# Patient Record
Sex: FEMALE | Race: BLACK OR AFRICAN AMERICAN | NOT HISPANIC OR LATINO | Employment: OTHER | URBAN - METROPOLITAN AREA
[De-identification: names, ages, dates, MRNs, and addresses within clinical notes are randomized per-mention and may not be internally consistent; named-entity substitution may affect disease eponyms.]

---

## 2019-01-01 ENCOUNTER — HOSPITAL ENCOUNTER (EMERGENCY)
Facility: HOSPITAL | Age: 84
Discharge: HOME | End: 2019-03-29
Attending: FAMILY MEDICINE
Payer: COMMERCIAL

## 2019-01-01 ENCOUNTER — HOSPITAL ENCOUNTER (INPATIENT)
Facility: HOSPITAL | Age: 84
LOS: 2 days | DRG: 951 | End: 2019-06-07
Attending: FAMILY MEDICINE | Admitting: FAMILY MEDICINE
Payer: COMMERCIAL

## 2019-01-01 VITALS
HEART RATE: 74 BPM | OXYGEN SATURATION: 98 % | TEMPERATURE: 98 F | DIASTOLIC BLOOD PRESSURE: 57 MMHG | RESPIRATION RATE: 19 BRPM | SYSTOLIC BLOOD PRESSURE: 115 MMHG | WEIGHT: 180 LBS

## 2019-01-01 VITALS
BODY MASS INDEX: 24.45 KG/M2 | SYSTOLIC BLOOD PRESSURE: 51 MMHG | HEIGHT: 63 IN | TEMPERATURE: 94 F | DIASTOLIC BLOOD PRESSURE: 33 MMHG | WEIGHT: 138 LBS

## 2019-01-01 DIAGNOSIS — R11.0 NAUSEA: ICD-10-CM

## 2019-01-01 DIAGNOSIS — A41.9 SEVERE SEPSIS: ICD-10-CM

## 2019-01-01 DIAGNOSIS — N39.0 URINARY TRACT INFECTION WITHOUT HEMATURIA, SITE UNSPECIFIED: Primary | ICD-10-CM

## 2019-01-01 DIAGNOSIS — R65.20 SEVERE SEPSIS: ICD-10-CM

## 2019-01-01 LAB
ALBUMIN SERPL BCP-MCNC: 4 G/DL (ref 3.5–5.2)
ALP SERPL-CCNC: 72 U/L (ref 38–126)
ALT SERPL W/O P-5'-P-CCNC: 10 U/L (ref 10–44)
ANION GAP SERPL CALC-SCNC: 17 MMOL/L (ref 8–16)
AST SERPL-CCNC: 12 U/L (ref 15–46)
BACTERIA #/AREA URNS AUTO: ABNORMAL /HPF
BASOPHILS # BLD AUTO: 0.03 K/UL (ref 0–0.2)
BASOPHILS NFR BLD: 0.5 % (ref 0–1.9)
BILIRUB SERPL-MCNC: 1.2 MG/DL (ref 0.1–1)
BILIRUB UR QL STRIP: ABNORMAL
BUN SERPL-MCNC: 12 MG/DL (ref 7–17)
CALCIUM SERPL-MCNC: 8.7 MG/DL (ref 8.7–10.5)
CHLORIDE SERPL-SCNC: 96 MMOL/L (ref 95–110)
CLARITY UR REFRACT.AUTO: CLEAR
CO2 SERPL-SCNC: 27 MMOL/L (ref 23–29)
COLOR UR AUTO: ABNORMAL
CREAT SERPL-MCNC: 0.87 MG/DL (ref 0.5–1.4)
DIFFERENTIAL METHOD: ABNORMAL
EOSINOPHIL # BLD AUTO: 0 K/UL (ref 0–0.5)
EOSINOPHIL NFR BLD: 0.3 % (ref 0–8)
ERYTHROCYTE [DISTWIDTH] IN BLOOD BY AUTOMATED COUNT: 17.4 % (ref 11.5–14.5)
EST. GFR  (AFRICAN AMERICAN): >60 ML/MIN/1.73 M^2
EST. GFR  (NON AFRICAN AMERICAN): 58 ML/MIN/1.73 M^2
GLUCOSE SERPL-MCNC: 117 MG/DL (ref 70–110)
GLUCOSE UR QL STRIP: NEGATIVE
HCT VFR BLD AUTO: 41.4 % (ref 37–48.5)
HGB BLD-MCNC: 14 G/DL (ref 12–16)
HGB UR QL STRIP: ABNORMAL
HYALINE CASTS UR QL AUTO: 2 /LPF
KETONES UR QL STRIP: ABNORMAL
LEUKOCYTE ESTERASE UR QL STRIP: ABNORMAL
LIPASE SERPL-CCNC: 33 U/L (ref 23–300)
LYMPHOCYTES # BLD AUTO: 1.4 K/UL (ref 1–4.8)
LYMPHOCYTES NFR BLD: 21.1 % (ref 18–48)
MCH RBC QN AUTO: 28.1 PG (ref 27–31)
MCHC RBC AUTO-ENTMCNC: 33.8 G/DL (ref 32–36)
MCV RBC AUTO: 83 FL (ref 82–98)
MICROSCOPIC COMMENT: ABNORMAL
MONOCYTES # BLD AUTO: 0.6 K/UL (ref 0.3–1)
MONOCYTES NFR BLD: 9.2 % (ref 4–15)
NEUTROPHILS # BLD AUTO: 4.4 K/UL (ref 1.8–7.7)
NEUTROPHILS NFR BLD: 68.6 % (ref 38–73)
NITRITE UR QL STRIP: NEGATIVE
PH UR STRIP: 6 [PH] (ref 5–8)
PLATELET # BLD AUTO: 187 K/UL (ref 150–350)
PMV BLD AUTO: 10.4 FL (ref 9.2–12.9)
POTASSIUM SERPL-SCNC: 2.6 MMOL/L (ref 3.5–5.1)
PROT SERPL-MCNC: 7.5 G/DL (ref 6–8.4)
PROT UR QL STRIP: ABNORMAL
RBC # BLD AUTO: 4.99 M/UL (ref 4–5.4)
RBC #/AREA URNS AUTO: 12 /HPF (ref 0–4)
SODIUM SERPL-SCNC: 140 MMOL/L (ref 136–145)
SP GR UR STRIP: 1.01 (ref 1–1.03)
SQUAMOUS #/AREA URNS AUTO: ABNORMAL /HPF
URN SPEC COLLECT METH UR: ABNORMAL
UROBILINOGEN UR STRIP-ACNC: ABNORMAL EU/DL
WBC # BLD AUTO: 6.4 K/UL (ref 3.9–12.7)
WBC #/AREA URNS AUTO: 3 /HPF (ref 0–5)

## 2019-01-01 PROCEDURE — 63600175 PHARM REV CODE 636 W HCPCS: Mod: ER | Performed by: FAMILY MEDICINE

## 2019-01-01 PROCEDURE — 81000 URINALYSIS NONAUTO W/SCOPE: CPT | Mod: ER

## 2019-01-01 PROCEDURE — 85025 COMPLETE CBC W/AUTO DIFF WBC: CPT | Mod: ER

## 2019-01-01 PROCEDURE — 11000001 HC ACUTE MED/SURG PRIVATE ROOM

## 2019-01-01 PROCEDURE — 63600175 PHARM REV CODE 636 W HCPCS: Performed by: FAMILY MEDICINE

## 2019-01-01 PROCEDURE — 25000003 PHARM REV CODE 250: Performed by: FAMILY MEDICINE

## 2019-01-01 PROCEDURE — 25000003 PHARM REV CODE 250: Mod: ER | Performed by: FAMILY MEDICINE

## 2019-01-01 PROCEDURE — 96365 THER/PROPH/DIAG IV INF INIT: CPT | Mod: ER

## 2019-01-01 PROCEDURE — 96361 HYDRATE IV INFUSION ADD-ON: CPT | Mod: ER

## 2019-01-01 PROCEDURE — 83690 ASSAY OF LIPASE: CPT | Mod: ER

## 2019-01-01 PROCEDURE — 99284 EMERGENCY DEPT VISIT MOD MDM: CPT | Mod: 25,ER

## 2019-01-01 PROCEDURE — 80053 COMPREHEN METABOLIC PANEL: CPT | Mod: ER

## 2019-01-01 PROCEDURE — 96375 TX/PRO/DX INJ NEW DRUG ADDON: CPT | Mod: ER

## 2019-01-01 RX ORDER — SCOLOPAMINE TRANSDERMAL SYSTEM 1 MG/1
1 PATCH, EXTENDED RELEASE TRANSDERMAL
Status: DISCONTINUED | OUTPATIENT
Start: 2019-01-01 | End: 2019-01-01 | Stop reason: HOSPADM

## 2019-01-01 RX ORDER — POTASSIUM CHLORIDE 20 MEQ/1
40 TABLET, EXTENDED RELEASE ORAL
Status: COMPLETED | OUTPATIENT
Start: 2019-01-01 | End: 2019-01-01

## 2019-01-01 RX ORDER — ONDANSETRON 2 MG/ML
4 INJECTION INTRAMUSCULAR; INTRAVENOUS
Status: COMPLETED | OUTPATIENT
Start: 2019-01-01 | End: 2019-01-01

## 2019-01-01 RX ORDER — CIPROFLOXACIN 500 MG/1
500 TABLET ORAL 2 TIMES DAILY
Qty: 20 TABLET | Refills: 0 | Status: SHIPPED | OUTPATIENT
Start: 2019-01-01 | End: 2019-01-01

## 2019-01-01 RX ORDER — CIPROFLOXACIN 2 MG/ML
400 INJECTION, SOLUTION INTRAVENOUS
Status: COMPLETED | OUTPATIENT
Start: 2019-01-01 | End: 2019-01-01

## 2019-01-01 RX ORDER — MORPHINE SULFATE 10 MG/ML
5 INJECTION INTRAMUSCULAR; INTRAVENOUS; SUBCUTANEOUS ONCE
Status: DISCONTINUED | OUTPATIENT
Start: 2019-01-01 | End: 2019-01-01 | Stop reason: HOSPADM

## 2019-01-01 RX ORDER — ONDANSETRON 4 MG/1
4 TABLET, FILM COATED ORAL EVERY 6 HOURS
Qty: 12 TABLET | Refills: 0 | Status: ON HOLD | OUTPATIENT
Start: 2019-01-01 | End: 2019-01-01 | Stop reason: HOSPADM

## 2019-01-01 RX ADMIN — SCOPALAMINE 1 PATCH: 1 PATCH, EXTENDED RELEASE TRANSDERMAL at 09:06

## 2019-01-01 RX ADMIN — ONDANSETRON 4 MG: 2 INJECTION INTRAMUSCULAR; INTRAVENOUS at 08:03

## 2019-01-01 RX ADMIN — POTASSIUM CHLORIDE 40 MEQ: 20 TABLET, EXTENDED RELEASE ORAL at 10:03

## 2019-01-01 RX ADMIN — CIPROFLOXACIN 400 MG: 2 INJECTION, SOLUTION INTRAVENOUS at 09:03

## 2019-01-01 RX ADMIN — MORPHINE SULFATE 8 MG/HR: 10 INJECTION INTRAVENOUS at 11:06

## 2019-01-01 RX ADMIN — MORPHINE SULFATE 8 MG/HR: 10 INJECTION INTRAVENOUS at 02:06

## 2019-01-01 RX ADMIN — LORAZEPAM 2 MG: 2 INJECTION INTRAMUSCULAR; INTRAVENOUS at 08:06

## 2019-01-01 RX ADMIN — MORPHINE SULFATE 4 MG/HR: 10 INJECTION INTRAVENOUS at 09:06

## 2019-01-01 RX ADMIN — SODIUM CHLORIDE 500 ML: 0.9 INJECTION, SOLUTION INTRAVENOUS at 08:03

## 2019-03-29 NOTE — ED TRIAGE NOTES
"per pts granddaughter "when she eats/drinks, it comes up." Pts PCP contacted and instructed to bring pt to ED to check for possible UTI. +Weakness. 2 episodes of diarrhea yesterday. No fever.  "

## 2019-03-30 NOTE — ED PROVIDER NOTES
"Encounter Date: 3/29/2019       History     Chief Complaint   Patient presents with    Emesis     per pts granddaughter "when she eats/drinks, it comes up." Pts PCP contacted and instructed to bring pt to ED to check for possible UTI. +Weakness. 2 episodes of diarrhea yesterday. No fever.     89-year-old female patient comes in with complaint vomiting each time she tries to the otherwise patient states that she just does not have an appetite patient states she does not have any abdominal pain with it no other complaints regarding that patient.  Patient otherwise has been getting around her norm and is able to think her normal process.  No fever chills or night sweats at        Review of patient's allergies indicates:   Allergen Reactions    Penicillins      Past Medical History:   Diagnosis Date    Arthritis     Cancer     Colon cancer 1999    Diabetes mellitus     Hypertension      Past Surgical History:   Procedure Laterality Date    KNEE SURGERY Right      History reviewed. No pertinent family history.  Social History     Tobacco Use    Smoking status: Never Smoker   Substance Use Topics    Alcohol use: Not Currently     Frequency: Never    Drug use: Not on file     Review of Systems   Constitutional: Negative for fever.   HENT: Negative for sore throat.    Respiratory: Negative for shortness of breath.    Cardiovascular: Negative for chest pain.   Gastrointestinal: Positive for nausea.   Genitourinary: Negative for dysuria.   Musculoskeletal: Negative for back pain.   Skin: Negative for rash.   Neurological: Negative for weakness.   Hematological: Does not bruise/bleed easily.   All other systems reviewed and are negative.      Physical Exam     Initial Vitals [03/29/19 1859]   BP Pulse Resp Temp SpO2   (!) 120/58 94 18 98.1 °F (36.7 °C) 96 %      MAP       --         Physical Exam    Nursing note and vitals reviewed.  Constitutional: She appears well-developed.   HENT:   Head: Normocephalic and " atraumatic.   Right Ear: External ear normal.   Left Ear: External ear normal.   Nose: Nose normal.   Mouth/Throat: Oropharynx is clear and moist.   Eyes: Conjunctivae and EOM are normal. Pupils are equal, round, and reactive to light. Right eye exhibits no discharge. Left eye exhibits no discharge.   Neck: Normal range of motion. Neck supple. No tracheal deviation present.   Cardiovascular: Normal rate, regular rhythm and normal heart sounds.   No murmur heard.  Pulmonary/Chest: Breath sounds normal. No respiratory distress. She has no wheezes.   Abdominal: Soft. Bowel sounds are normal.   Neurological: She is alert and oriented to person, place, and time.   Skin: Skin is warm and dry.         ED Course   Procedures  Labs Reviewed   CBC W/ AUTO DIFFERENTIAL - Abnormal; Notable for the following components:       Result Value    RDW 17.4 (*)     All other components within normal limits   COMPREHENSIVE METABOLIC PANEL - Abnormal; Notable for the following components:    Potassium 2.6 (*)     Glucose 117 (*)     Total Bilirubin 1.2 (*)     AST 12 (*)     Anion Gap 17 (*)     eGFR if non  58.0 (*)     All other components within normal limits    Narrative:        K+ critical result(s) called and verbal readback obtained from    Brianna White RN, 03/29/2019 21:59   URINALYSIS, REFLEX TO URINE CULTURE - Abnormal; Notable for the following components:    Color, UA Brown (*)     Protein, UA 1+ (*)     Ketones, UA 3+ (*)     Bilirubin (UA) 1+ (*)     Occult Blood UA 3+ (*)     Urobilinogen, UA 4.0-6.0 (*)     Leukocytes, UA 1+ (*)     All other components within normal limits    Narrative:     Preferred Collection Type->Urine, Clean Catch   URINALYSIS MICROSCOPIC - Abnormal; Notable for the following components:    RBC, UA 12 (*)     Hyaline Casts, UA 2 (*)     All other components within normal limits    Narrative:     Preferred Collection Type->Urine, Clean Catch   LIPASE          Imaging Results     None          Medical Decision Making:   Initial Assessment:   Patient in moderate distress and no other complains    Differential Diagnosis:   Appendicitis , constipation, diverticulitis, colitis, gastroenteritis                        Clinical Impression:       ICD-10-CM ICD-9-CM   1. Urinary tract infection without hematuria, site unspecified N39.0 599.0   2. Nausea R11.0 787.02                                Woodrow Barger MD  03/29/19 2242

## 2019-06-05 PROBLEM — Z51.5 COMFORT MEASURES ONLY STATUS: Status: ACTIVE | Noted: 2019-01-01

## 2019-06-05 PROBLEM — R65.20 SEVERE SEPSIS: Status: ACTIVE | Noted: 2019-01-01

## 2019-06-05 PROBLEM — Z51.5 PALLIATIVE CARE ENCOUNTER: Status: ACTIVE | Noted: 2019-01-01

## 2019-06-05 PROBLEM — A41.9 SEVERE SEPSIS: Status: ACTIVE | Noted: 2019-01-01

## 2019-06-05 PROBLEM — F03.90 DEMENTIA WITHOUT BEHAVIORAL DISTURBANCE: Status: ACTIVE | Noted: 2019-01-01

## 2019-06-05 PROBLEM — Z71.89 GOALS OF CARE, COUNSELING/DISCUSSION: Status: ACTIVE | Noted: 2019-01-01

## 2019-06-05 NOTE — SUBJECTIVE & OBJECTIVE
Interval History: unresponsive. Discussed comfort care with family    Medications:  Continuous Infusions:  Scheduled Meds:  PRN Meds:    Objective:     Vital Signs (Most Recent):    Vital Signs (24h Range):  Temp:  [97.4 °F (36.3 °C)-97.8 °F (36.6 °C)] 97.5 °F (36.4 °C)  Pulse:  [123-139] 129  Resp:  [14-31] 17  SpO2:  [74 %-100 %] 98 %  BP: ()/(48-61) 86/48        There is no height or weight on file to calculate BMI.    Review of Symptoms  Symptom Assessment (ESAS 0-10 scale)  ESAS 0 1 2 3 4 5 6 7 8 9 10   Pain              Dyspnea              Anxiety              Nausea              Depression               Anorexia              Fatigue              Insomnia              Restlessness               Agitation              CAM / Delirium x__ --  ___+   Constipation     __ --  ___+   Diarrhea           x__ --  ___+  Bowel Management Plan (BMP): Yes  x  Performance Status: 10    ECOG Performance Status Grade: 4 - Completely disabled    Physical Exam    Significant Labs: None  CBC:   Recent Labs   Lab 06/05/19  1500   WBC 15.51*   HGB 5.6*   HCT 18.0*   MCV 95        BMP:  Recent Labs   Lab 06/05/19  1500   *   *   K 3.0*   *   CO2 5*   BUN 22   CREATININE 1.3   CALCIUM 7.3*   MG 1.1*     LFT:  Lab Results   Component Value Date    AST 49 (H) 06/05/2019    ALKPHOS 75 06/05/2019    BILITOT 3.9 (H) 06/05/2019     Albumin:   Albumin   Date Value Ref Range Status   06/05/2019 1.4 (L) 3.5 - 5.2 g/dL Final     Protein:   Total Protein   Date Value Ref Range Status   06/05/2019 3.6 (L) 6.0 - 8.4 g/dL Final     Lactic acid:   Lab Results   Component Value Date    LACTATE >12.0 (HH) 06/05/2019       Significant Imaging: None    Advanced Directives::  Living Will: Yes. Copy on chart: Yes  LaPOST: Yes  Do Not Resuscitate Status: Yes  Medical Power of : Yes. Agent's Name: Kay Turcios. Agent's Contact Number: [phone]    Decision-Making Capacity: Family answered questions, Patient  unable to communicate due to disease severity/cognitive impairment    Living Arrangements: Lives with family    Psychosocial/Cultural:  Patient's most important priorities:  Unable to asses    Patient's biggest concerns/fears:  Unable to asses    Previous death/end of life care history:  Unable to asses    Patient's goals/hopes:  Unable to asses

## 2019-06-05 NOTE — HPI
Kaylah Turcios is a 89 y.o. F, with PMH of Arthritis, Cancer, Colon cancer (1999), Diabetes mellitus, and Hypertension.  Patient presents to the ED via EMS due to respiratory arrest just prior to arrival. Patient was reported to be lethargic and weak today. EMS reports while en route to the ER patient became unresponsive and stopped breathing. She was intubated via NT tube en route per EMS     Palliative has been consulted for end of life hospice care        Palliative care met with family grand daughter, son, sisters, to establish a rapport and and assess family understanding of current clinical condition and its relationship to goals of care. Family recently notified patient has dementia and during wound care visit were told patient wound is worsening because of malnutrition and end stage dementia. Family educated on clinical status and disease progression. Family made a decision to transition to comfort measures. Emotional comfort provided.      [facility]   Patient on levophed, SBP still in the 80s. BP 86/48. Patient too unstable for transportation at this time. Requiring inpatient hospice services until stable or symptoms controlled.  Plan:   - Patient too unstable for transportation at this time. Requiring inhospital hospice services until stable or symptoms controlled.  - consult [facility] for inpatient service

## 2019-06-05 NOTE — ASSESSMENT & PLAN NOTE
Palliative care encounter  Goals of care discussion  Comfort Measures  Ativan for agitation  Morphine for dyspnea  Bereavement tray

## 2019-06-06 NOTE — SUBJECTIVE & OBJECTIVE
Interval History: unresponsive, no family by bedside.   Tachycardia increase morphine    Medications:  Continuous Infusions:  Scheduled Meds:  PRN Meds:    Objective:     Vital Signs (Most Recent):  Temp: (!) 93.6 °F (34.2 °C) (06/05/19 2156)  Pulse: 95 (06/06/19 0750)  Resp: 16 (06/06/19 0750)  BP: (!) 51/33 (06/06/19 0750) Vital Signs (24h Range):  Temp:  [93.6 °F (34.2 °C)-97.8 °F (36.6 °C)] 93.6 °F (34.2 °C)  Pulse:  [] 95  Resp:  [14-31] 16  SpO2:  [74 %-100 %] 98 %  BP: ()/(33-61) 51/33        There is no height or weight on file to calculate BMI.    Review of Symptoms unable to assess  Symptom Assessment (ESAS 0-10 scale)  ESAS 0 1 2 3 4 5 6 7 8 9 10   Pain              Dyspnea              Anxiety              Nausea              Depression               Anorexia              Fatigue              Insomnia              Restlessness               Agitation              CAM / Delirium x__ --  ___+   Constipation     __ --  ___+   Diarrhea           x__ --  ___+  Bowel Management Plan (BMP): Yes  x  Performance Status: 10    ECOG Performance Status Grade: 4 - Completely disabled    Physical Exam   Constitutional: She appears well-developed.   Neurological: She is unresponsive.       Significant Labs: None  CBC:   Recent Labs   Lab 06/05/19  1500   WBC 15.51*   HGB 5.6*   HCT 18.0*   MCV 95        BMP:  Recent Labs   Lab 06/05/19  1500   *   *   K 3.0*   *   CO2 5*   BUN 22   CREATININE 1.3   CALCIUM 7.3*   MG 1.1*     LFT:  Lab Results   Component Value Date    AST 49 (H) 06/05/2019    ALKPHOS 75 06/05/2019    BILITOT 3.9 (H) 06/05/2019     Albumin:   Albumin   Date Value Ref Range Status   06/05/2019 1.4 (L) 3.5 - 5.2 g/dL Final     Protein:   Total Protein   Date Value Ref Range Status   06/05/2019 3.6 (L) 6.0 - 8.4 g/dL Final     Lactic acid:   Lab Results   Component Value Date    LACTATE >12.0 (HH) 06/05/2019       Significant Imaging: None    Advanced  Directives::  Living Will: Yes. Copy on chart: Yes  LaPOST: Yes  Do Not Resuscitate Status: Yes  Medical Power of : Yes. Agent's Name: Kay Turcios. Agent's Contact Number: [phone]    Decision-Making Capacity: Family answered questions, Patient unable to communicate due to disease severity/cognitive impairment    Living Arrangements: Lives with family    Psychosocial/Cultural:  Patient's most important priorities:  Unable to asses    Patient's biggest concerns/fears:  Unable to asses    Previous death/end of life care history:  Unable to asses    Patient's goals/hopes:  Unable to asses

## 2019-06-06 NOTE — PROGRESS NOTES
[facility]  Palliative Medicine  History and Physical    Patient Name: Kaylah Turcios  MRN: [id number]  Admission Date: (Not on file)  Hospital Length of Stay: 0 days  Code Status: No Order   Attending Provider: Yessi Abarca*  Consulting Provider: Yessi Abarca MD  Primary Care Physician: Primary Doctor No  Principal Problem:Sepsis    Patient information was obtained from relative(s) and ER records.      Assessment/Plan:     * Sepsis  Dementia  Sacral stage IV decubitus Ulcer  Severe malnutrition   Patient decline food  bedbound      Comfort measures only status  Palliative care encounter  Goals of care discussion  Comfort Measures  Ativan for agitation  Morphine for dyspnea  Bereavement tray        none    Subjective:     Chief Complaint: No chief complaint on file.      HPI:   Kaylah Turcios is a 89 y.o. F, with PMH of Arthritis, Cancer, Colon cancer (1999), Diabetes mellitus, and Hypertension.  Patient presents to the ED via EMS due to respiratory arrest just prior to arrival. Patient was reported to be lethargic and weak today. EMS reports while en route to the ER patient became unresponsive and stopped breathing. She was intubated via NT tube en route per EMS     Palliative has been consulted for end of life hospice care        Palliative care met with family grand daughter, son, sisters, to establish a rapport and and assess family understanding of current clinical condition and its relationship to goals of care. Family recently notified patient has dementia and during wound care visit were told patient wound is worsening because of malnutrition and end stage dementia. Family educated on clinical status and disease progression. Family made a decision to transition to comfort measures. Emotional comfort provided.      [facility].   Patient on levophed, SBP still in the 80s. BP 86/48. Patient too unstable for transportation at this time. Requiring inpatient  hospice services until stable or symptoms controlled.  Plan:   - Patient too unstable for transportation at this time. Requiring inhospital hospice services until stable or symptoms controlled.  - consult [facility] for inpatient service    Hospital Course:  No notes on file    Interval History: unresponsive. Discussed comfort care with family    Medications:  Continuous Infusions:  Scheduled Meds:  PRN Meds:    Objective:     Vital Signs (Most Recent):    Vital Signs (24h Range):  Temp:  [97.4 °F (36.3 °C)-97.8 °F (36.6 °C)] 97.5 °F (36.4 °C)  Pulse:  [123-139] 129  Resp:  [14-31] 17  SpO2:  [74 %-100 %] 98 %  BP: ()/(48-61) 86/48        There is no height or weight on file to calculate BMI.    Review of Symptoms  Symptom Assessment (ESAS 0-10 scale)  ESAS 0 1 2 3 4 5 6 7 8 9 10   Pain              Dyspnea              Anxiety              Nausea              Depression               Anorexia              Fatigue              Insomnia              Restlessness               Agitation              CAM / Delirium x__ --  ___+   Constipation     __ --  ___+   Diarrhea           x__ --  ___+  Bowel Management Plan (BMP): Yes  x  Performance Status: 10    ECOG Performance Status Grade: 4 - Completely disabled    Physical Exam    Significant Labs: None  CBC:   Recent Labs   Lab 06/05/19  1500   WBC 15.51*   HGB 5.6*   HCT 18.0*   MCV 95        BMP:  Recent Labs   Lab 06/05/19  1500   *   *   K 3.0*   *   CO2 5*   BUN 22   CREATININE 1.3   CALCIUM 7.3*   MG 1.1*     LFT:  Lab Results   Component Value Date    AST 49 (H) 06/05/2019    ALKPHOS 75 06/05/2019    BILITOT 3.9 (H) 06/05/2019     Albumin:   Albumin   Date Value Ref Range Status   06/05/2019 1.4 (L) 3.5 - 5.2 g/dL Final     Protein:   Total Protein   Date Value Ref Range Status   06/05/2019 3.6 (L) 6.0 - 8.4 g/dL Final     Lactic acid:   Lab Results   Component Value Date    LACTATE >12.0 (HH) 06/05/2019       Significant  Imaging: None    Advanced Directives::  Living Will: Yes. Copy on chart: Yes  LaPOST: Yes  Do Not Resuscitate Status: Yes  Medical Power of : Yes. Agent's Name: Kay Turcios. Agent's Contact Number: [phone]    Decision-Making Capacity: Family answered questions, Patient unable to communicate due to disease severity/cognitive impairment    Living Arrangements: Lives with family    Psychosocial/Cultural:  Patient's most important priorities:  Unable to asses    Patient's biggest concerns/fears:  Unable to asses    Previous death/end of life care history:  Unable to asses    Patient's goals/hopes:  Unable to asses          > 50% of 65 min visit spent in chart review, face to face discussion of goals of care,  symptom assessment, coordination of care and emotional support.    Yessi Abarca MD  Palliative Medicine  [facility]

## 2019-06-06 NOTE — PLAN OF CARE
Problem: Adult Inpatient Plan of Care  Goal: Plan of Care Review  Outcome: Ongoing (interventions implemented as appropriate)  Inpatient hospice comfort measures maintained. Morphine gtt infusing at 4mg/hr. Patients respirations have remained unlabored but shallow ranging [id number]/min.  Patient only responsive to painful stimulation. Sierra catheter in place for comfort measures. Wound to sacrum with dressing in place.  Will continue to monitor

## 2019-06-06 NOTE — PLAN OF CARE
Patient arrived to room 419 via stretcher and placed into bed. Patient with even respirations at 21/minute. Patient minimally responsive to verbal and tactile stimuli. Extremities cool to the touch.  Right chest wall picc dressing present on arrival with dressing c/d/i.  Sierra catheter in place present on arrival to floor for comfort measures. Urine output noted to be dark, cloudy crown.  Safety measures maintained and bed alarm set. Will continue to monitor.

## 2019-06-06 NOTE — H&P
[facility]  Palliative Medicine  History and Physical    Patient Name: Kaylah Turcios  MRN: [id number]  Admission Date: (Not on file)  Hospital Length of Stay: 0 days  Code Status: No Order   Attending Provider: Yessi Abarca*  Consulting Provider: Yessi Abarca MD  Primary Care Physician: Primary Doctor No  Principal Problem:Sepsis    Patient information was obtained from relative(s) and ER records.      Assessment/Plan:     * Sepsis  Dementia  Sacral stage IV decubitus Ulcer  Severe malnutrition   Patient decline food  bedbound      Comfort measures only status  Palliative care encounter  Goals of care discussion  Comfort Measures  Ativan for agitation  Morphine for dyspnea  Bereavement tray        none    Subjective:     Chief Complaint: No chief complaint on file.      HPI:   Kaylah Turcios is a 89 y.o. F, with PMH of Arthritis, Cancer, Colon cancer (1999), Diabetes mellitus, and Hypertension.  Patient presents to the ED via EMS due to respiratory arrest just prior to arrival. Patient was reported to be lethargic and weak today. EMS reports while en route to the ER patient became unresponsive and stopped breathing. She was intubated via NT tube en route per EMS     Palliative has been consulted for end of life hospice care        Palliative care met with family grand daughter, son, sisters, to establish a rapport and and assess family understanding of current clinical condition and its relationship to goals of care. Family recently notified patient has dementia and during wound care visit were told patient wound is worsening because of malnutrition and end stage dementia. Family educated on clinical status and disease progression. Family made a decision to transition to comfort measures. Emotional comfort provided.      [facility].   Patient on levophed, SBP still in the 80s. BP 86/48. Patient too unstable for transportation at this time. Requiring inpatient  hospice services until stable or symptoms controlled.  Plan:   - Patient too unstable for transportation at this time. Requiring inhospital hospice services until stable or symptoms controlled.  - consult [facility] for inpatient service    Hospital Course:  No notes on file    Interval History: unresponsive. Discussed comfort care with family    Medications:  Continuous Infusions:  Scheduled Meds:  PRN Meds:    Objective:     Vital Signs (Most Recent):    Vital Signs (24h Range):  Temp:  [97.4 °F (36.3 °C)-97.8 °F (36.6 °C)] 97.5 °F (36.4 °C)  Pulse:  [123-139] 129  Resp:  [14-31] 17  SpO2:  [74 %-100 %] 98 %  BP: ()/(48-61) 86/48        There is no height or weight on file to calculate BMI.    Review of Symptoms  Symptom Assessment (ESAS 0-10 scale)  ESAS 0 1 2 3 4 5 6 7 8 9 10   Pain              Dyspnea              Anxiety              Nausea              Depression               Anorexia              Fatigue              Insomnia              Restlessness               Agitation              CAM / Delirium x__ --  ___+   Constipation     __ --  ___+   Diarrhea           x__ --  ___+  Bowel Management Plan (BMP): Yes  x  Performance Status: 10    ECOG Performance Status Grade: 4 - Completely disabled    Physical Exam    Significant Labs: None  CBC:   Recent Labs   Lab 06/05/19  1500   WBC 15.51*   HGB 5.6*   HCT 18.0*   MCV 95        BMP:  Recent Labs   Lab 06/05/19  1500   *   *   K 3.0*   *   CO2 5*   BUN 22   CREATININE 1.3   CALCIUM 7.3*   MG 1.1*     LFT:  Lab Results   Component Value Date    AST 49 (H) 06/05/2019    ALKPHOS 75 06/05/2019    BILITOT 3.9 (H) 06/05/2019     Albumin:   Albumin   Date Value Ref Range Status   06/05/2019 1.4 (L) 3.5 - 5.2 g/dL Final     Protein:   Total Protein   Date Value Ref Range Status   06/05/2019 3.6 (L) 6.0 - 8.4 g/dL Final     Lactic acid:   Lab Results   Component Value Date    LACTATE >12.0 (HH) 06/05/2019       Significant  Imaging: None    Advanced Directives::  Living Will: Yes. Copy on chart: Yes  LaPOST: Yes  Do Not Resuscitate Status: Yes  Medical Power of : Yes. Agent's Name: Kay Turcios. Agent's Contact Number: [phone]    Decision-Making Capacity: Family answered questions, Patient unable to communicate due to disease severity/cognitive impairment    Living Arrangements: Lives with family    Psychosocial/Cultural:  Patient's most important priorities:  Unable to asses    Patient's biggest concerns/fears:  Unable to asses    Previous death/end of life care history:  Unable to asses    Patient's goals/hopes:  Unable to asses        > 50% of 65 min visit spent in chart review, face to face discussion of goals of care,  symptom assessment, coordination of care and emotional support.    Yessi Abarca MD  Palliative Medicine  [facility]

## 2019-06-06 NOTE — PROGRESS NOTES
[facility]  Palliative Medicine  Progress Note    Patient Name: Kaylah Turcios  MRN: [id number]  Admission Date: 6/5/2019  Hospital Length of Stay: 1 days  Code Status: DNR   Attending Provider: Yessi Abarca*  Consulting Provider: Yessi Abarca MD  Primary Care Physician: Primary Doctor No  Principal Problem:Sepsis    Patient information was obtained from relative(s) and ER records.      Assessment/Plan:     * Sepsis  Dementia  Sacral stage IV decubitus Ulcer  Severe malnutrition   Patient decline food  bedbound      Comfort measures only status  Palliative care encounter  Goals of care discussion  Comfort Measures  Ativan for agitation  Morphine for dyspnea  Bereavement tray        none     Subjective:     Chief Complaint: No chief complaint on file.      HPI:   Kaylah Turcios is a 89 y.o. F, with PMH of Arthritis, Cancer, Colon cancer (1999), Diabetes mellitus, and Hypertension.  Patient presents to the ED via EMS due to respiratory arrest just prior to arrival. Patient was reported to be lethargic and weak today. EMS reports while en route to the ER patient became unresponsive and stopped breathing. She was intubated via NT tube en route per EMS     Palliative has been consulted for end of life hospice care        Palliative care met with family grand daughter, son, sisters, to establish a rapport and and assess family understanding of current clinical condition and its relationship to goals of care. Family recently notified patient has dementia and during wound care visit were told patient wound is worsening because of malnutrition and end stage dementia. Family educated on clinical status and disease progression. Family made a decision to transition to comfort measures. Emotional comfort provided.      [facility].   Patient on levophed, SBP still in the 80s. BP 86/48. Patient too unstable for transportation at this time. Requiring inpatient hospice services  until stable or symptoms controlled.  Plan:   - Patient too unstable for transportation at this time. Requiring inhospital hospice services until stable or symptoms controlled.  - consult [facility] for inpatient service    Hospital Course:  No notes on file    Interval History: unresponsive, no family by bedside.   Tachycardia increase morphine    Medications:  Continuous Infusions:  Scheduled Meds:  PRN Meds:    Objective:     Vital Signs (Most Recent):  Temp: (!) 93.6 °F (34.2 °C) (06/05/19 2156)  Pulse: 95 (06/06/19 0750)  Resp: 16 (06/06/19 0750)  BP: (!) 51/33 (06/06/19 0750) Vital Signs (24h Range):  Temp:  [93.6 °F (34.2 °C)-97.8 °F (36.6 °C)] 93.6 °F (34.2 °C)  Pulse:  [] 95  Resp:  [14-31] 16  SpO2:  [74 %-100 %] 98 %  BP: ()/(33-61) 51/33        There is no height or weight on file to calculate BMI.    Review of Symptoms unable to assess  Symptom Assessment (ESAS 0-10 scale)  ESAS 0 1 2 3 4 5 6 7 8 9 10   Pain              Dyspnea              Anxiety              Nausea              Depression               Anorexia              Fatigue              Insomnia              Restlessness               Agitation              CAM / Delirium x__ --  ___+   Constipation     __ --  ___+   Diarrhea           x__ --  ___+  Bowel Management Plan (BMP): Yes  x  Performance Status: 10    ECOG Performance Status Grade: 4 - Completely disabled    Physical Exam   Constitutional: She appears well-developed.   Neurological: She is unresponsive.       Significant Labs: None  CBC:   Recent Labs   Lab 06/05/19  1500   WBC 15.51*   HGB 5.6*   HCT 18.0*   MCV 95        BMP:  Recent Labs   Lab 06/05/19  1500   *   *   K 3.0*   *   CO2 5*   BUN 22   CREATININE 1.3   CALCIUM 7.3*   MG 1.1*     LFT:  Lab Results   Component Value Date    AST 49 (H) 06/05/2019    ALKPHOS 75 06/05/2019    BILITOT 3.9 (H) 06/05/2019     Albumin:   Albumin   Date Value Ref Range Status   06/05/2019 1.4 (L)  3.5 - 5.2 g/dL Final     Protein:   Total Protein   Date Value Ref Range Status   06/05/2019 3.6 (L) 6.0 - 8.4 g/dL Final     Lactic acid:   Lab Results   Component Value Date    LACTATE >12.0 (HH) 06/05/2019       Significant Imaging: None    Advanced Directives::  Living Will: Yes. Copy on chart: Yes  [place]POST: Yes  Do Not Resuscitate Status: Yes  Medical Power of : Yes. Agent's Name: Kay Turcios. Agent's Contact Number:     Decision-Making Capacity: Family answered questions, Patient unable to communicate due to disease severity/cognitive impairment    Living Arrangements: Lives with family    Psychosocial/Cultural:  Patient's most important priorities:  Unable to asses    Patient's biggest concerns/fears:  Unable to asses    Previous death/end of life care history:  Unable to asses    Patient's goals/hopes:  Unable to asses          > 50% of 45 min visit spent in chart review, symptom assessment, coordination of care and emotional support.    Yessi Abarca MD  Palliative Medicine  [facility].

## 2019-06-06 NOTE — PLAN OF CARE
Patient lying in bed, resting quietly. Unresponsive for all of shift. No S/S of pain or discomfort noted at this time. Patient's respirations noted to have decreased throughout shift. Morphine gtt infusing @ 8 mg/hour. Hospice nurse at bedside this shift. Patient's vitals unable to obtain with dinamap. Extremities cool to touch. Sierra catheter CDI with dark, yellow urine noted draining to  bag. No odor noted. No tele. Plan of care and medications reviewed with patient's family- all verbalize understanding. Bed in lowest position, side rails up x 2, call light within reach. Will continue to monitor patient.

## 2019-06-06 NOTE — NURSING
Cued into patient's room for admit question. Patient is nonresponsive and there is no family at the bedside. Bed alarm is on with side rails x2 for patient safety. Will continue to be available as needed

## 2019-06-06 NOTE — PLAN OF CARE
YANN note: VN reviewed progress notes. Patient admitted inpatient hospice. Will continue to be available as needed.     06/06/19 0900   Type of Frequent Check   Type Patient Rounds;Other (see comments)  (VN Rounds, Hospice Patient)

## 2019-06-07 PROBLEM — R65.20 SEVERE SEPSIS: Status: RESOLVED | Noted: 2019-01-01 | Resolved: 2019-01-01

## 2019-06-07 PROBLEM — A41.9 SEVERE SEPSIS: Status: RESOLVED | Noted: 2019-01-01 | Resolved: 2019-01-01

## 2019-06-07 PROBLEM — F03.90 DEMENTIA WITHOUT BEHAVIORAL DISTURBANCE: Status: RESOLVED | Noted: 2019-01-01 | Resolved: 2019-01-01

## 2019-06-07 PROBLEM — Z51.5 PALLIATIVE CARE ENCOUNTER: Status: RESOLVED | Noted: 2019-01-01 | Resolved: 2019-01-01

## 2019-06-07 PROBLEM — Z71.89 GOALS OF CARE, COUNSELING/DISCUSSION: Status: RESOLVED | Noted: 2019-01-01 | Resolved: 2019-01-01

## 2019-06-07 PROBLEM — Z51.5 COMFORT MEASURES ONLY STATUS: Status: RESOLVED | Noted: 2019-01-01 | Resolved: 2019-01-01

## 2019-06-07 NOTE — PLAN OF CARE
Patient has 0 RR and no pulse. [facility] hospice nurse notified at 0226. No family present at bedside.

## 2019-06-07 NOTE — PLAN OF CARE
06/07/19 0854   Final Note   Assessment Type Final Discharge Note   Anticipated Discharge Disposition Hospice

## 2019-06-07 NOTE — NURSING
Received patient upon rounds at 1901H, patient seen in bed on semi-oneal's position. Unconscious, response to painful stimuli. GCS 6. RR-12, comfortable. Extremities cold to touch, blankets provided. With right subclavian picc line double lumen, with clean and dry dressing.Left Ac gauge 20 IV site infusing Morphine 8 mg/hr infusing well via pump.With indwelling santillan's catheter draining dark tea colored urine.Stage 4 sacral sore, with clean and dry dressing mepilex intact.  Bed alarm ON. Safety fall precaution maintained.Will continue to monitor patient.  0220H- Patient no pulse, no breathing. Charge Nurse updated, will call hospice Nurse.Morphine drip discontinued, wasted 73.7cc witnessed by Nurse Cummings. Will ff up.   0300H- Seen by Hospice Nurse Lou, no family around, Lou coordinated with the  family, will be able to come to the hospital in two hours. Will ff up.

## 2019-06-07 NOTE — SUBJECTIVE & OBJECTIVE
Patient was non-responsive. There were no palpable peripheral pulses, no spontaneous breaths or breath sounds, no heart sounds, and her pupils were fixed and non-reactive. Pronounced on 6/7/2019 @ 7642

## 2019-06-07 NOTE — DISCHARGE SUMMARY
[facility]  Discharge Summary      Admit Date: 6/5/2019    Discharge Date and Time: 6/7/2019 at 0306      [facility]     Reason for Admission: comfort measures    Procedures Performed: * No surgery found *    Hospital Course (synopsis of major diagnoses, care, treatment, and services provided during the course of the hospital stay):     Kaylah Turcios is a 89 y.o. F, with PMH of Arthritis, Cancer, Colon cancer (1999), Diabetes mellitus, and Hypertension.  Patient presents to the ED via EMS due to respiratory arrest just prior to arrival. Patient was reported to be lethargic and weak today. EMS reports while en route to the ER patient became unresponsive and stopped breathing. She was intubated via NT tube en route per EMS     Palliative has been consulted for end of life hospice care     Palliative care met with family grand daughter, son, sisters, to establish a rapport and and assess family understanding of current clinical condition and its relationship to goals of care. Family recently notified patient has dementia and during wound care visit were told patient wound is worsening because of malnutrition and end stage dementia. Family educated on clinical status and disease progression. Family made a decision to transition to comfort measures. Emotional comfort provided.       [facility]   Patient on levophed, SBP still in the 80s. BP 86/48. Patient too unstable for transportation at this time. Requiring inpatient hospice services until stable or symptoms controlled.  Plan:   - Patient too unstable for transportation at this time. Requiring inhospital hospice services until stable or symptoms controlled.  - consult [facility] for inpatient service       6/6  unresponsive, no family by bedside.   Tachycardia increase morphine      6/7  Patient was non-responsive. There were no palpable peripheral pulses, no spontaneous breaths or breath  sounds, no heart sounds, and her pupils were fixed and non-reactive. Pronounced on 2019 @ 0306    Consults: none    Significant Diagnostic Studies:     Final Diagnoses:    Principal Problem: Sepsis   Secondary Diagnoses:   Active Hospital Problems   No active problems to display.      Resolved Hospital Problems    Diagnosis Date Resolved POA    *Sepsis [A41.9] 2019 Yes    Palliative care encounter [Z51.5] 2019 Not Applicable    Goals of care, counseling/discussion [Z71.89] 2019 Not Applicable    Dementia without behavioral disturbance [F03.90] 2019 Yes    Comfort measures only status [Z51.5] 2019 Not Applicable    Severe sepsis [A41.9, R65.20] 2019 Yes    Sacral decubitus ulcer, stage IV [L89.154] 2019 Yes       Discharged Condition:     Disposition:     Follow Up/Patient Instructions:     Medications:  None (patient  at medical facility)  No discharge procedures on file.          Yessi Abarca MD, MPH  Palliative Medicine  [facility]         [phone]        > 50% of 30 min visit spent in chart review, coordination of care and emotional support.

## 2019-06-07 NOTE — PLAN OF CARE
Problem: Adult Inpatient Plan of Care  Goal: Plan of Care Review  Outcome: Ongoing (interventions implemented as appropriate)  Family arriving at this time. Will do post mortem care. No  yet. Patient will stay in the morgue for the meantime. Family will coordinate.   0634H- Family decided which  home to take the body. [facility] hospice nurse coordinating.